# Patient Record
Sex: FEMALE | Race: WHITE | Employment: UNEMPLOYED | URBAN - METROPOLITAN AREA
[De-identification: names, ages, dates, MRNs, and addresses within clinical notes are randomized per-mention and may not be internally consistent; named-entity substitution may affect disease eponyms.]

---

## 2024-01-21 ENCOUNTER — HOSPITAL ENCOUNTER (EMERGENCY)
Age: 64
Discharge: ANOTHER HOSPITAL | End: 2024-01-22
Attending: EMERGENCY MEDICINE | Admitting: EMERGENCY MEDICINE
Payer: MEDICAID

## 2024-01-21 ENCOUNTER — APPOINTMENT (OUTPATIENT)
Dept: CT IMAGING | Age: 64
End: 2024-01-21
Payer: MEDICAID

## 2024-01-21 DIAGNOSIS — M86.60 OTHER CHRONIC OSTEOMYELITIS, UNSPECIFIED SITE (HCC): ICD-10-CM

## 2024-01-21 DIAGNOSIS — M53.3 SACRAL BACK PAIN: Primary | ICD-10-CM

## 2024-01-21 LAB
BASOPHILS # BLD: 0 K/UL (ref 0–0.1)
BASOPHILS NFR BLD: 0.4 % (ref 0.1–1.2)
EOSINOPHIL # BLD: 0.2 K/UL (ref 0–0.4)
EOSINOPHIL NFR BLD: 1.9 % (ref 0.7–5.8)
ERYTHROCYTE [DISTWIDTH] IN BLOOD BY AUTOMATED COUNT: 12.4 % (ref 11.7–14.4)
HCT VFR BLD AUTO: 45.2 % (ref 37–47)
HGB BLD-MCNC: 15.4 G/DL (ref 11.2–15.7)
IMM GRANULOCYTES # BLD: 0 K/UL
IMM GRANULOCYTES NFR BLD: 0.3 %
LYMPHOCYTES # BLD: 1 K/UL (ref 1.2–3.7)
LYMPHOCYTES NFR BLD: 11.2 %
MCH RBC QN AUTO: 32.5 PG (ref 25.6–32.2)
MCHC RBC AUTO-ENTMCNC: 34.1 % (ref 32.2–35.5)
MCV RBC AUTO: 95.4 FL (ref 79.4–94.8)
MONOCYTES # BLD: 1 K/UL (ref 0.2–0.9)
MONOCYTES NFR BLD: 11.5 % (ref 4.7–12.5)
NEUTROPHILS # BLD: 6.7 K/UL (ref 1.6–6.1)
NEUTS SEG NFR BLD: 74.7 % (ref 34–71.1)
PLATELET # BLD AUTO: 296 K/UL (ref 182–369)
RBC # BLD AUTO: 4.74 M/UL (ref 3.93–5.22)
WBC # BLD AUTO: 9 K/UL (ref 4–10)

## 2024-01-21 PROCEDURE — 85025 COMPLETE CBC W/AUTO DIFF WBC: CPT

## 2024-01-21 PROCEDURE — 36415 COLL VENOUS BLD VENIPUNCTURE: CPT

## 2024-01-21 PROCEDURE — 6360000002 HC RX W HCPCS: Performed by: EMERGENCY MEDICINE

## 2024-01-21 PROCEDURE — 96365 THER/PROPH/DIAG IV INF INIT: CPT

## 2024-01-21 PROCEDURE — 96375 TX/PRO/DX INJ NEW DRUG ADDON: CPT

## 2024-01-21 PROCEDURE — 96366 THER/PROPH/DIAG IV INF ADDON: CPT

## 2024-01-21 PROCEDURE — 80053 COMPREHEN METABOLIC PANEL: CPT

## 2024-01-21 PROCEDURE — 99285 EMERGENCY DEPT VISIT HI MDM: CPT

## 2024-01-21 PROCEDURE — 72131 CT LUMBAR SPINE W/O DYE: CPT

## 2024-01-21 PROCEDURE — 96368 THER/DIAG CONCURRENT INF: CPT

## 2024-01-21 PROCEDURE — 72192 CT PELVIS W/O DYE: CPT

## 2024-01-21 RX ORDER — DULOXETIN HYDROCHLORIDE 60 MG/1
60 CAPSULE, DELAYED RELEASE ORAL DAILY
COMMUNITY
Start: 2023-07-18 | End: 2024-07-17

## 2024-01-21 RX ORDER — ONDANSETRON 2 MG/ML
4 INJECTION INTRAMUSCULAR; INTRAVENOUS ONCE
Status: COMPLETED | OUTPATIENT
Start: 2024-01-21 | End: 2024-01-21

## 2024-01-21 RX ORDER — PREGABALIN 200 MG/1
200 CAPSULE ORAL 2 TIMES DAILY
COMMUNITY
Start: 2023-09-08

## 2024-01-21 RX ORDER — LEVOTHYROXINE SODIUM 0.05 MG/1
50 TABLET ORAL DAILY
COMMUNITY
Start: 2023-07-18

## 2024-01-21 RX ORDER — MORPHINE SULFATE 4 MG/ML
4 INJECTION, SOLUTION INTRAMUSCULAR; INTRAVENOUS
Status: COMPLETED | OUTPATIENT
Start: 2024-01-21 | End: 2024-01-21

## 2024-01-21 RX ORDER — AMITRIPTYLINE HYDROCHLORIDE 25 MG/1
1 TABLET, FILM COATED ORAL NIGHTLY
COMMUNITY
Start: 2023-07-18

## 2024-01-21 RX ORDER — SIMVASTATIN 20 MG
20 TABLET ORAL NIGHTLY
COMMUNITY
Start: 2023-07-18

## 2024-01-21 RX ADMIN — MORPHINE SULFATE 4 MG: 4 INJECTION INTRAVENOUS at 23:37

## 2024-01-21 RX ADMIN — ONDANSETRON 4 MG: 2 INJECTION INTRAMUSCULAR; INTRAVENOUS at 23:37

## 2024-01-21 ASSESSMENT — PAIN - FUNCTIONAL ASSESSMENT: PAIN_FUNCTIONAL_ASSESSMENT: 0-10

## 2024-01-21 ASSESSMENT — LIFESTYLE VARIABLES
HOW MANY STANDARD DRINKS CONTAINING ALCOHOL DO YOU HAVE ON A TYPICAL DAY: PATIENT DOES NOT DRINK
HOW OFTEN DO YOU HAVE A DRINK CONTAINING ALCOHOL: NEVER

## 2024-01-21 ASSESSMENT — PAIN DESCRIPTION - LOCATION
LOCATION: BACK;HIP
LOCATION: BACK;HIP

## 2024-01-21 ASSESSMENT — PAIN SCALES - GENERAL
PAINLEVEL_OUTOF10: 9
PAINLEVEL_OUTOF10: 9

## 2024-01-21 ASSESSMENT — PAIN DESCRIPTION - ORIENTATION: ORIENTATION: LOWER;RIGHT

## 2024-01-22 ENCOUNTER — HOSPITAL ENCOUNTER (INPATIENT)
Dept: ULTRASOUND IMAGING | Age: 64
Discharge: HOME | DRG: 347 | End: 2024-01-24
Attending: INTERNAL MEDICINE
Payer: MEDICAID

## 2024-01-22 ENCOUNTER — HOSPITAL ENCOUNTER (INPATIENT)
Age: 64
LOS: 1 days | Discharge: HOME HEALTH | DRG: 347 | End: 2024-01-23
Attending: INTERNAL MEDICINE | Admitting: INTERNAL MEDICINE
Payer: MEDICAID

## 2024-01-22 VITALS
OXYGEN SATURATION: 91 % | DIASTOLIC BLOOD PRESSURE: 46 MMHG | HEIGHT: 65 IN | BODY MASS INDEX: 31.65 KG/M2 | HEART RATE: 76 BPM | SYSTOLIC BLOOD PRESSURE: 96 MMHG | TEMPERATURE: 98 F | WEIGHT: 190 LBS | RESPIRATION RATE: 18 BRPM

## 2024-01-22 VITALS
DIASTOLIC BLOOD PRESSURE: 65 MMHG | OXYGEN SATURATION: 93 % | HEART RATE: 68 BPM | SYSTOLIC BLOOD PRESSURE: 109 MMHG | RESPIRATION RATE: 16 BRPM

## 2024-01-22 DIAGNOSIS — M53.3 SACRAL BACK PAIN: Primary | ICD-10-CM

## 2024-01-22 PROBLEM — L03.317 CELLULITIS AND ABSCESS OF BUTTOCK: Status: ACTIVE | Noted: 2024-01-22

## 2024-01-22 PROBLEM — L02.31 CELLULITIS AND ABSCESS OF BUTTOCK: Status: ACTIVE | Noted: 2024-01-22

## 2024-01-22 LAB
ALBUMIN SERPL-MCNC: 3.7 G/DL (ref 3.5–4.6)
ALBUMIN SERPL-MCNC: 4.2 G/DL (ref 3.5–4.6)
ALP SERPL-CCNC: 80 U/L (ref 40–130)
ALP SERPL-CCNC: 93 U/L (ref 40–130)
ALT SERPL-CCNC: 11 U/L (ref 0–33)
ALT SERPL-CCNC: 9 U/L (ref 0–33)
ANION GAP SERPL CALCULATED.3IONS-SCNC: 11 MEQ/L (ref 9–15)
ANION GAP SERPL CALCULATED.3IONS-SCNC: 12 MEQ/L (ref 9–15)
AST SERPL-CCNC: 14 U/L (ref 0–35)
AST SERPL-CCNC: 14 U/L (ref 0–35)
BASOPHILS # BLD: 0 K/UL (ref 0–0.2)
BASOPHILS NFR BLD: 0.5 %
BILIRUB SERPL-MCNC: 0.4 MG/DL (ref 0.2–0.7)
BILIRUB SERPL-MCNC: 0.6 MG/DL (ref 0.2–0.7)
BUN SERPL-MCNC: 11 MG/DL (ref 8–23)
BUN SERPL-MCNC: 12 MG/DL (ref 8–23)
CALCIUM SERPL-MCNC: 8.8 MG/DL (ref 8.5–9.9)
CALCIUM SERPL-MCNC: 9.8 MG/DL (ref 8.5–9.9)
CHLORIDE SERPL-SCNC: 104 MEQ/L (ref 95–107)
CHLORIDE SERPL-SCNC: 107 MEQ/L (ref 95–107)
CO2 SERPL-SCNC: 25 MEQ/L (ref 20–31)
CO2 SERPL-SCNC: 27 MEQ/L (ref 20–31)
CREAT SERPL-MCNC: 0.73 MG/DL (ref 0.5–0.9)
CREAT SERPL-MCNC: 0.77 MG/DL (ref 0.5–0.9)
EOSINOPHIL # BLD: 0.2 K/UL (ref 0–0.7)
EOSINOPHIL NFR BLD: 2 %
ERYTHROCYTE [DISTWIDTH] IN BLOOD BY AUTOMATED COUNT: 12.5 % (ref 11.5–14.5)
GLOBULIN SER CALC-MCNC: 2.8 G/DL (ref 2.3–3.5)
GLOBULIN SER CALC-MCNC: 3.1 G/DL (ref 2.3–3.5)
GLUCOSE SERPL-MCNC: 111 MG/DL (ref 70–99)
GLUCOSE SERPL-MCNC: 120 MG/DL (ref 70–99)
HCT VFR BLD AUTO: 41.8 % (ref 37–47)
HGB BLD-MCNC: 14 G/DL (ref 12–16)
INR PPP: 1.1
LYMPHOCYTES # BLD: 1.2 K/UL (ref 1–4.8)
LYMPHOCYTES NFR BLD: 15.6 %
MCH RBC QN AUTO: 32.1 PG (ref 27–31.3)
MCHC RBC AUTO-ENTMCNC: 33.5 % (ref 33–37)
MCV RBC AUTO: 95.9 FL (ref 79.4–94.8)
MONOCYTES # BLD: 1.2 K/UL (ref 0.2–0.8)
MONOCYTES NFR BLD: 16 %
NEUTROPHILS # BLD: 5 K/UL (ref 1.4–6.5)
NEUTS SEG NFR BLD: 65.5 %
PLATELET # BLD AUTO: 303 K/UL (ref 130–400)
POTASSIUM SERPL-SCNC: 3.3 MEQ/L (ref 3.4–4.9)
POTASSIUM SERPL-SCNC: 3.9 MEQ/L (ref 3.4–4.9)
PROT SERPL-MCNC: 6.5 G/DL (ref 6.3–8)
PROT SERPL-MCNC: 7.3 G/DL (ref 6.3–8)
PROTHROMBIN TIME: 15.3 SEC (ref 12.3–14.9)
RBC # BLD AUTO: 4.36 M/UL (ref 4.2–5.4)
SODIUM SERPL-SCNC: 143 MEQ/L (ref 135–144)
SODIUM SERPL-SCNC: 143 MEQ/L (ref 135–144)
WBC # BLD AUTO: 7.6 K/UL (ref 4.8–10.8)

## 2024-01-22 PROCEDURE — 6370000000 HC RX 637 (ALT 250 FOR IP): Performed by: INTERNAL MEDICINE

## 2024-01-22 PROCEDURE — 6360000002 HC RX W HCPCS: Performed by: INTERNAL MEDICINE

## 2024-01-22 PROCEDURE — 2580000003 HC RX 258: Performed by: INTERNAL MEDICINE

## 2024-01-22 PROCEDURE — 2500000003 HC RX 250 WO HCPCS: Performed by: RADIOLOGY

## 2024-01-22 PROCEDURE — 99254 IP/OBS CNSLTJ NEW/EST MOD 60: CPT | Performed by: INTERNAL MEDICINE

## 2024-01-22 PROCEDURE — 2580000003 HC RX 258: Performed by: EMERGENCY MEDICINE

## 2024-01-22 PROCEDURE — 76942 ECHO GUIDE FOR BIOPSY: CPT | Performed by: RADIOLOGY

## 2024-01-22 PROCEDURE — 85025 COMPLETE CBC W/AUTO DIFF WBC: CPT

## 2024-01-22 PROCEDURE — 99222 1ST HOSP IP/OBS MODERATE 55: CPT | Performed by: NURSE PRACTITIONER

## 2024-01-22 PROCEDURE — 36415 COLL VENOUS BLD VENIPUNCTURE: CPT

## 2024-01-22 PROCEDURE — 97162 PT EVAL MOD COMPLEX 30 MIN: CPT

## 2024-01-22 PROCEDURE — 1210000000 HC MED SURG R&B

## 2024-01-22 PROCEDURE — 97166 OT EVAL MOD COMPLEX 45 MIN: CPT

## 2024-01-22 PROCEDURE — 0YJ13ZZ INSPECTION OF LEFT BUTTOCK, PERCUTANEOUS APPROACH: ICD-10-PCS | Performed by: RADIOLOGY

## 2024-01-22 PROCEDURE — 80053 COMPREHEN METABOLIC PANEL: CPT

## 2024-01-22 PROCEDURE — 6360000002 HC RX W HCPCS: Performed by: EMERGENCY MEDICINE

## 2024-01-22 PROCEDURE — 76942 ECHO GUIDE FOR BIOPSY: CPT

## 2024-01-22 PROCEDURE — 85610 PROTHROMBIN TIME: CPT

## 2024-01-22 RX ORDER — SODIUM CHLORIDE 0.9 % (FLUSH) 0.9 %
5-40 SYRINGE (ML) INJECTION EVERY 12 HOURS SCHEDULED
Status: DISCONTINUED | OUTPATIENT
Start: 2024-01-22 | End: 2024-01-23 | Stop reason: HOSPADM

## 2024-01-22 RX ORDER — ONDANSETRON 2 MG/ML
4 INJECTION INTRAMUSCULAR; INTRAVENOUS EVERY 6 HOURS PRN
Status: DISCONTINUED | OUTPATIENT
Start: 2024-01-22 | End: 2024-01-23 | Stop reason: HOSPADM

## 2024-01-22 RX ORDER — ENOXAPARIN SODIUM 100 MG/ML
40 INJECTION SUBCUTANEOUS DAILY
Status: DISCONTINUED | OUTPATIENT
Start: 2024-01-22 | End: 2024-01-23 | Stop reason: HOSPADM

## 2024-01-22 RX ORDER — TRAMADOL HYDROCHLORIDE 50 MG/1
50 TABLET ORAL EVERY 6 HOURS PRN
Status: DISCONTINUED | OUTPATIENT
Start: 2024-01-22 | End: 2024-01-22

## 2024-01-22 RX ORDER — PREGABALIN 100 MG/1
200 CAPSULE ORAL 2 TIMES DAILY
Status: DISCONTINUED | OUTPATIENT
Start: 2024-01-22 | End: 2024-01-23 | Stop reason: HOSPADM

## 2024-01-22 RX ORDER — OXYCODONE HYDROCHLORIDE 5 MG/1
5 TABLET ORAL EVERY 4 HOURS PRN
Status: DISCONTINUED | OUTPATIENT
Start: 2024-01-22 | End: 2024-01-23 | Stop reason: HOSPADM

## 2024-01-22 RX ORDER — TRAMADOL HYDROCHLORIDE 50 MG/1
TABLET ORAL
Status: DISCONTINUED
Start: 2024-01-22 | End: 2024-01-22

## 2024-01-22 RX ORDER — ONDANSETRON 4 MG/1
4 TABLET, ORALLY DISINTEGRATING ORAL EVERY 8 HOURS PRN
Status: DISCONTINUED | OUTPATIENT
Start: 2024-01-22 | End: 2024-01-23 | Stop reason: HOSPADM

## 2024-01-22 RX ORDER — POTASSIUM CHLORIDE 7.45 MG/ML
10 INJECTION INTRAVENOUS PRN
Status: DISCONTINUED | OUTPATIENT
Start: 2024-01-22 | End: 2024-01-23 | Stop reason: HOSPADM

## 2024-01-22 RX ORDER — MAGNESIUM SULFATE IN WATER 40 MG/ML
2000 INJECTION, SOLUTION INTRAVENOUS PRN
Status: DISCONTINUED | OUTPATIENT
Start: 2024-01-22 | End: 2024-01-23 | Stop reason: HOSPADM

## 2024-01-22 RX ORDER — ACETAMINOPHEN 325 MG/1
650 TABLET ORAL EVERY 6 HOURS PRN
Status: DISCONTINUED | OUTPATIENT
Start: 2024-01-22 | End: 2024-01-22

## 2024-01-22 RX ORDER — POTASSIUM CHLORIDE 20 MEQ/1
40 TABLET, EXTENDED RELEASE ORAL PRN
Status: DISCONTINUED | OUTPATIENT
Start: 2024-01-22 | End: 2024-01-23 | Stop reason: HOSPADM

## 2024-01-22 RX ORDER — POLYETHYLENE GLYCOL 3350 17 G/17G
17 POWDER, FOR SOLUTION ORAL DAILY PRN
Status: DISCONTINUED | OUTPATIENT
Start: 2024-01-22 | End: 2024-01-23 | Stop reason: HOSPADM

## 2024-01-22 RX ORDER — KETOROLAC TROMETHAMINE 15 MG/ML
15 INJECTION, SOLUTION INTRAMUSCULAR; INTRAVENOUS EVERY 6 HOURS PRN
Status: DISCONTINUED | OUTPATIENT
Start: 2024-01-22 | End: 2024-01-22

## 2024-01-22 RX ORDER — ACETAMINOPHEN 325 MG/1
650 TABLET ORAL 3 TIMES DAILY
Status: DISCONTINUED | OUTPATIENT
Start: 2024-01-22 | End: 2024-01-23 | Stop reason: HOSPADM

## 2024-01-22 RX ORDER — SODIUM CHLORIDE 9 MG/ML
INJECTION, SOLUTION INTRAVENOUS PRN
Status: DISCONTINUED | OUTPATIENT
Start: 2024-01-22 | End: 2024-01-23 | Stop reason: HOSPADM

## 2024-01-22 RX ORDER — MORPHINE SULFATE 2 MG/ML
2 INJECTION, SOLUTION INTRAMUSCULAR; INTRAVENOUS EVERY 4 HOURS PRN
Status: DISCONTINUED | OUTPATIENT
Start: 2024-01-22 | End: 2024-01-23

## 2024-01-22 RX ORDER — DULOXETIN HYDROCHLORIDE 30 MG/1
60 CAPSULE, DELAYED RELEASE ORAL DAILY
Status: DISCONTINUED | OUTPATIENT
Start: 2024-01-22 | End: 2024-01-23 | Stop reason: HOSPADM

## 2024-01-22 RX ORDER — KETOROLAC TROMETHAMINE 15 MG/ML
15 INJECTION, SOLUTION INTRAMUSCULAR; INTRAVENOUS EVERY 8 HOURS
Status: DISCONTINUED | OUTPATIENT
Start: 2024-01-22 | End: 2024-01-23 | Stop reason: HOSPADM

## 2024-01-22 RX ORDER — CYCLOBENZAPRINE HCL 5 MG
10 TABLET ORAL 3 TIMES DAILY PRN
Status: DISCONTINUED | OUTPATIENT
Start: 2024-01-22 | End: 2024-01-23 | Stop reason: HOSPADM

## 2024-01-22 RX ORDER — ATORVASTATIN CALCIUM 10 MG/1
10 TABLET, FILM COATED ORAL
Status: DISCONTINUED | OUTPATIENT
Start: 2024-01-22 | End: 2024-01-23 | Stop reason: HOSPADM

## 2024-01-22 RX ORDER — ONDANSETRON 2 MG/ML
4 INJECTION INTRAMUSCULAR; INTRAVENOUS ONCE
Status: DISCONTINUED | OUTPATIENT
Start: 2024-01-22 | End: 2024-01-22 | Stop reason: HOSPADM

## 2024-01-22 RX ORDER — AMITRIPTYLINE HYDROCHLORIDE 25 MG/1
25 TABLET, FILM COATED ORAL NIGHTLY
Status: DISCONTINUED | OUTPATIENT
Start: 2024-01-22 | End: 2024-01-23 | Stop reason: HOSPADM

## 2024-01-22 RX ORDER — LIDOCAINE HYDROCHLORIDE 20 MG/ML
INJECTION, SOLUTION INFILTRATION; PERINEURAL PRN
Status: COMPLETED | OUTPATIENT
Start: 2024-01-22 | End: 2024-01-22

## 2024-01-22 RX ORDER — HYDROMORPHONE HYDROCHLORIDE 1 MG/ML
1 INJECTION, SOLUTION INTRAMUSCULAR; INTRAVENOUS; SUBCUTANEOUS
Status: COMPLETED | OUTPATIENT
Start: 2024-01-22 | End: 2024-01-22

## 2024-01-22 RX ORDER — SODIUM CHLORIDE 0.9 % (FLUSH) 0.9 %
5-40 SYRINGE (ML) INJECTION PRN
Status: DISCONTINUED | OUTPATIENT
Start: 2024-01-22 | End: 2024-01-23 | Stop reason: HOSPADM

## 2024-01-22 RX ORDER — LEVOTHYROXINE SODIUM 0.05 MG/1
50 TABLET ORAL DAILY
Status: DISCONTINUED | OUTPATIENT
Start: 2024-01-22 | End: 2024-01-23 | Stop reason: HOSPADM

## 2024-01-22 RX ORDER — NICOTINE 21 MG/24HR
1 PATCH, TRANSDERMAL 24 HOURS TRANSDERMAL DAILY
Status: DISCONTINUED | OUTPATIENT
Start: 2024-01-22 | End: 2024-01-23 | Stop reason: HOSPADM

## 2024-01-22 RX ORDER — LIDOCAINE 4 G/G
1 PATCH TOPICAL DAILY
Status: DISCONTINUED | OUTPATIENT
Start: 2024-01-22 | End: 2024-01-23 | Stop reason: HOSPADM

## 2024-01-22 RX ORDER — ACETAMINOPHEN 650 MG/1
650 SUPPOSITORY RECTAL EVERY 6 HOURS PRN
Status: DISCONTINUED | OUTPATIENT
Start: 2024-01-22 | End: 2024-01-22

## 2024-01-22 RX ADMIN — ACETAMINOPHEN 650 MG: 325 TABLET ORAL at 10:17

## 2024-01-22 RX ADMIN — KETOROLAC TROMETHAMINE 15 MG: 15 INJECTION, SOLUTION INTRAMUSCULAR; INTRAVENOUS at 08:00

## 2024-01-22 RX ADMIN — MORPHINE SULFATE 2 MG: 2 INJECTION, SOLUTION INTRAMUSCULAR; INTRAVENOUS at 15:46

## 2024-01-22 RX ADMIN — OXYCODONE 5 MG: 5 TABLET ORAL at 19:57

## 2024-01-22 RX ADMIN — HYDROMORPHONE HYDROCHLORIDE 1 MG: 1 INJECTION, SOLUTION INTRAMUSCULAR; INTRAVENOUS; SUBCUTANEOUS at 00:33

## 2024-01-22 RX ADMIN — PREGABALIN 200 MG: 100 CAPSULE ORAL at 10:13

## 2024-01-22 RX ADMIN — OXYCODONE 5 MG: 5 TABLET ORAL at 10:13

## 2024-01-22 RX ADMIN — PREGABALIN 200 MG: 100 CAPSULE ORAL at 19:50

## 2024-01-22 RX ADMIN — ATORVASTATIN CALCIUM 10 MG: 10 TABLET, FILM COATED ORAL at 19:50

## 2024-01-22 RX ADMIN — LEVOTHYROXINE SODIUM 50 MCG: 0.05 TABLET ORAL at 10:13

## 2024-01-22 RX ADMIN — TRAMADOL HYDROCHLORIDE 50 MG: 50 TABLET ORAL at 05:40

## 2024-01-22 RX ADMIN — AMITRIPTYLINE HYDROCHLORIDE 25 MG: 25 TABLET, FILM COATED ORAL at 19:56

## 2024-01-22 RX ADMIN — LIDOCAINE HYDROCHLORIDE 15 ML: 20 INJECTION, SOLUTION INFILTRATION; PERINEURAL at 15:22

## 2024-01-22 RX ADMIN — VANCOMYCIN HYDROCHLORIDE 2000 MG: 1 INJECTION, POWDER, LYOPHILIZED, FOR SOLUTION INTRAVENOUS at 00:33

## 2024-01-22 RX ADMIN — SODIUM CHLORIDE, PRESERVATIVE FREE 10 ML: 5 INJECTION INTRAVENOUS at 19:51

## 2024-01-22 RX ADMIN — SODIUM CHLORIDE 2000 MG: 9 INJECTION, SOLUTION INTRAVENOUS at 00:10

## 2024-01-22 RX ADMIN — ACETAMINOPHEN 650 MG: 325 TABLET ORAL at 19:50

## 2024-01-22 RX ADMIN — CYCLOBENZAPRINE HYDROCHLORIDE 10 MG: 5 TABLET, FILM COATED ORAL at 10:14

## 2024-01-22 RX ADMIN — SODIUM CHLORIDE, PRESERVATIVE FREE 10 ML: 5 INJECTION INTRAVENOUS at 08:00

## 2024-01-22 RX ADMIN — KETOROLAC TROMETHAMINE 15 MG: 15 INJECTION, SOLUTION INTRAMUSCULAR; INTRAVENOUS at 19:51

## 2024-01-22 RX ADMIN — CYCLOBENZAPRINE HYDROCHLORIDE 10 MG: 5 TABLET, FILM COATED ORAL at 19:50

## 2024-01-22 RX ADMIN — DULOXETINE 60 MG: 30 CAPSULE, DELAYED RELEASE ORAL at 10:13

## 2024-01-22 RX ADMIN — OXYCODONE 5 MG: 5 TABLET ORAL at 14:07

## 2024-01-22 RX ADMIN — KETOROLAC TROMETHAMINE 15 MG: 15 INJECTION, SOLUTION INTRAMUSCULAR; INTRAVENOUS at 14:06

## 2024-01-22 ASSESSMENT — PAIN DESCRIPTION - DESCRIPTORS
DESCRIPTORS: ACHING;DISCOMFORT;THROBBING
DESCRIPTORS: DISCOMFORT
DESCRIPTORS: BURNING;DISCOMFORT
DESCRIPTORS: ACHING;BURNING;DISCOMFORT
DESCRIPTORS: BURNING

## 2024-01-22 ASSESSMENT — PAIN SCALES - GENERAL
PAINLEVEL_OUTOF10: 8
PAINLEVEL_OUTOF10: 2
PAINLEVEL_OUTOF10: 7
PAINLEVEL_OUTOF10: 0
PAINLEVEL_OUTOF10: 10
PAINLEVEL_OUTOF10: 7
PAINLEVEL_OUTOF10: 9
PAINLEVEL_OUTOF10: 7
PAINLEVEL_OUTOF10: 8

## 2024-01-22 ASSESSMENT — PAIN DESCRIPTION - LOCATION
LOCATION: LEG
LOCATION: BACK;BUTTOCKS;HIP
LOCATION: LEG
LOCATION: BACK;LEG
LOCATION: BACK
LOCATION: BACK
LOCATION: BACK;BUTTOCKS
LOCATION: LEG
LOCATION: BACK
LOCATION: LEG
LOCATION: BACK;HIP

## 2024-01-22 ASSESSMENT — PAIN DESCRIPTION - ORIENTATION
ORIENTATION: RIGHT
ORIENTATION: RIGHT;LOWER
ORIENTATION: RIGHT
ORIENTATION: LEFT

## 2024-01-22 ASSESSMENT — ENCOUNTER SYMPTOMS
BACK PAIN: 1
RESPIRATORY NEGATIVE: 1
GASTROINTESTINAL NEGATIVE: 1

## 2024-01-22 NOTE — PROGRESS NOTES
1230 update given to Cecile AVELAR regarding procedure for US guided gluteal aspiration today. Will call for patient to come to radiology around 1430.

## 2024-01-22 NOTE — BRIEF OP NOTE
Preliminary  Procedure Note, Full Note To Follow in PACS  Vascular and Interventional Radiology      Nena Christian  1960  [id number]    Date of Procedure: 01/22/24    Physician: Guillermo Moran MD, DABR    Pre-Op Diagnosis: Fluid collection in left gluteal region     Post-Op Diagnosis:  No fluid was able to be aspirated, either too small or scarr       Procedure: Attempted aspiration of left gluteal collection    Estimated Blood Loss (mL): Minimal    Complications: None    Specimens: none    Implants: none    Drains: none    Findings: Needle with sheath was advanced into the collection under ultrasound. Attempted aspiration did not reveal any fluid. Will image with CT again in a few days, if discharged as outpatient. If larger we could attempt re-aspiration.     Electronically signed by GUILLERMO MORAN MD on 1/22/2024 at 3:29 PM

## 2024-01-22 NOTE — H&P
01/21/2024 11:38 PM     01/21/2024 11:38 PM    CO2 27 01/21/2024 11:38 PM    BUN 12 01/21/2024 11:38 PM    CREATININE 0.77 01/21/2024 11:38 PM    GFRAA 69.7 11/25/2011 11:08 PM    LABGLOM >60.0 01/21/2024 11:38 PM    GLUCOSE 120 01/21/2024 11:38 PM    GLUCOSE 105 11/25/2011 11:08 PM    PROT 7.3 01/21/2024 11:38 PM    LABALBU 4.2 01/21/2024 11:38 PM    LABALBU 4.0 11/25/2011 11:08 PM    CALCIUM 9.8 01/21/2024 11:38 PM    BILITOT 0.6 01/21/2024 11:38 PM    ALKPHOS 93 01/21/2024 11:38 PM    AST 14 01/21/2024 11:38 PM    ALT 11 01/21/2024 11:38 PM     BMP:    Lab Results   Component Value Date/Time     01/21/2024 11:38 PM    K 3.3 01/21/2024 11:38 PM     01/21/2024 11:38 PM    CO2 27 01/21/2024 11:38 PM    BUN 12 01/21/2024 11:38 PM    LABALBU 4.2 01/21/2024 11:38 PM    LABALBU 4.0 11/25/2011 11:08 PM    CREATININE 0.77 01/21/2024 11:38 PM    CALCIUM 9.8 01/21/2024 11:38 PM    GFRAA 69.7 11/25/2011 11:08 PM    LABGLOM >60.0 01/21/2024 11:38 PM    GLUCOSE 120 01/21/2024 11:38 PM    GLUCOSE 105 11/25/2011 11:08 PM       Radiology: No results found.      ASSESSMENT/ PLAN::      Principal Problem:    Sacral pain  Resolved Problems:    * No resolved hospital problems. *      Fluid collection extending into the gluteal region-no recent surgery or procedure.  No recent infection or sepsis.  Unknown if there is sterile fluid collection versus abscess.  ID is on consult.  Will follow the recommendation regarding antibiotic if needed.  Will consult IR for drainage.  Sacroiliitis-pain control   Chronic osteomyelitis-history of recurrent osteomyelitis in the pubic symphysis and ramus area more than 10 years ago.  No signs of infection currently.  Patient did receive antibiotic in the ER but unknown if she requires antibiotic.  Will consult infectious disease for recommendation.  Hypokalemia-recheck and treat    Code Status: Full  DVT prophylaxis: Lovenox    I spent 55 minutes in total reviewing labs, imaging,

## 2024-01-22 NOTE — PLAN OF CARE
See OT evaluation for all goals and OT POC. Electronically signed by Lela Rolle OTR/L on 1/22/2024 at 1:06 PM

## 2024-01-22 NOTE — ED PROVIDER NOTES
initially without much improvement of her discomfort.  Patient was started on IV vancomycin and IV Rocephin while in the emergency department.  She was also given additional 1 mg of IV Dilaudid and 4 mg of IV Zofran with improvement of her discomfort.    Case was discussed with Dr. Walker, via [facility], who recommended the patient be transferred to [facility] Case was then discussed with Dr. Campbell who agreed to accept the patient in transfer.    Final Clinical impression;  1) bilateral sacroiliitis  2) CT scan concerning for chronic osteomyelitis    Disposition/plan; patient transferred via EMS to [facility] for further evaluation and treatment.     Josafat Canada, DO  01/22/24 0202

## 2024-01-22 NOTE — CARE COORDINATION
Case Management Assessment  Initial Evaluation    Date/Time of Evaluation: 1/22/2024 11:49 AM  Assessment Completed by: JERARDO Esquivel    If patient is discharged prior to next notation, then this note serves as note for discharge by case management.    Patient Name: Nena Christian                   YOB: 1960  Diagnosis: Sacral pain [M53.3]                   Date / Time: 1/22/2024  3:47 AM    Patient Admission Status: Inpatient   Readmission Risk (Low < 19, Mod (19-27), High > 27): Readmission Risk Score: 7.6    Current PCP: Chon Jo MD  PCP verified by CM? Yes    Chart Reviewed: Yes      History Provided by: Patient  Patient Orientation: Alert and Oriented    Patient Cognition: Alert    Hospitalization in the last 30 days (Readmission):  No    If yes, Readmission Assessment in CM Navigator will be completed.    Advance Directives:      Code Status: Full Code   Patient's Primary Decision Maker is: Legal Next of Kin    Primary Decision Maker: Cortes Christian - Child - [phone]    Discharge Planning:    Patient lives with: Spouse/Significant Other Type of Home: House  Primary Care Giver: Self  Patient Support Systems include: Family Members, Spouse/Significant Other   Current Financial resources: Medicaid  Current community resources: None  Current services prior to admission: None            Current DME:              Type of Home Care services:  None    ADLS  Prior functional level: Independent in ADLs/IADLs  Current functional level: Independent in ADLs/IADLs    PT AM-PAC:   /24  OT AM-PAC:   /24    Family can provide assistance at DC: Yes  Would you like Case Management to discuss the discharge plan with any other family members/significant others, and if so, who? Yes (bf)  Plans to Return to Present Housing: Yes  Other Identified Issues/Barriers to RETURNING to current housing: MEDICAL CLEARANCE  Potential Assistance needed at discharge: N/A            Potential DME:    Patient expects to

## 2024-01-22 NOTE — ED TRIAGE NOTES
Pt. Presents to ED with complaints of lower back pain that radiates into right hip and down leg.  Pt. Concerned about osteomyelitis which she has had in the past.

## 2024-01-22 NOTE — OR NURSING
1505 Pt taken into US room, on bed, and assisted into prone position. Scans completed. Consent confirmed.    1511 Timeout completed. Dr. Cross obtaining US images.     1517 Site marked, prepped generously with chlorhexidine.    1520 After 3 minute dry time, draped in sterile fashion.     1522 Skin numbed with Lidocaine 2% by Roselyn Morales CNP using US guidance.     1523 US guidance used to place 5 Fr by 10 cm centesis introducer.  Additional lidocaine given through introducer.     1525 Dr. Cross and Roselyn Morales unable to obtain any fluid through centesis catheter.     1526 Introducer withdrawn, pressure held at site then band aid applied. Verbal and tactile reassurance provided throughout procedure.     1530 Report called to ROSIO Huber in TCU. Transport to take pt back to room.

## 2024-01-22 NOTE — PROGRESS NOTES
Physical Therapy Med Surg Initial Assessment  Facility/Department: [facility]  Room: [facility]/[place] [address]       NAME: Nena Christian  : 1960 (63 y.o.)  MRN: [id number]  CODE STATUS: Full Code    Date of Service: 2024    Patient Diagnosis(es): Sacral pain [M53.3]   No chief complaint on file.    Patient Active Problem List    Diagnosis Date Noted    Sacral pain 2024        Past Medical History:   Diagnosis Date    Hyperlipidemia     Thyroid disease      No past surgical history on file.    Chart Reviewed: Yes  Patient assessed for rehabilitation services?: Yes  Family / Caregiver Present: No  Diagnosis: Sacral pain  General Comment  Comments: Pt resting in bed - agreeable to PT evaluation    Restrictions:  Restrictions/Precautions: Fall Risk, Up as Tolerated     SUBJECTIVE:   Subjective: \"I hope these pain meds are working.\"    Pain  Pain: 3/10 pre and post session pain pelvis and LBP. RN notified. Pt agreeable to continue with session.    Prior Level of Function:  Social/Functional History  Lives With: Significant other  Type of Home: House  Home Layout: One level  Home Access: Level entry  Bathroom Shower/Tub: Walk-in shower  Bathroom Equipment: Shower chair  Home Equipment: Cane, Walker, rolling, Wheelchair-manual  Has the patient had two or more falls in the past year or any fall with injury in the past year?: No  ADL Assistance: Independent  Homemaking Assistance: Independent  Ambulation Assistance: Independent  Transfer Assistance: Independent  Active : Yes  Additional Comments: Pt states she has had previous bouts of osteomylitis    OBJECTIVE:   Vision  Vision: Impaired  Vision Exceptions: Wears glasses at all times  Hearing: Within functional limits    Cognition:  Overall Orientation Status: Within Functional Limits  Follows Commands: Within Functional Limits  Overall Cognitive Status: WFL    Observation/Palpation  Observation: Pt alert and attentive, no acute distress, pleasant and

## 2024-01-22 NOTE — PROGRESS NOTES
Pt assessed this AM.  PRN and routine meds given for back, bilat hip and rt leg pain.  Pt did report ordered Ultram was not effective and refused to take when offered.  New PRN Roxicodone ordered, pt reports effective.  Pt currently off unit for procedure with Dr Cross.

## 2024-01-22 NOTE — ACP (ADVANCE CARE PLANNING)
Advance Care Planning   Healthcare Decision Maker:    Primary Decision Maker: Cortes Christian - Child - [phone]    Click here to complete Healthcare Decision Makers including selection of the Healthcare Decision Maker Relationship (ie \"Primary\").  Today we documented Decision Maker(s) consistent with Legal Next of Kin hierarchy.       If the relationship to the patient does NOT follow our state's Next of Kin hierarchy, the patient MUST complete an ACP Document to allow him/her to act on the patient's behalf. :      Electronically signed by JERARDO Esquivel on 1/22/2024 at 11:49 AM

## 2024-01-22 NOTE — PROGRESS NOTES
[facility] OCC[facility]Y EVALUATION - ACUTE     NAME: Nena Christian  : 1960 (63 y.o.)  MRN: [id number]  CODE STATUS: Full Code  Room: [place] [address]    Date of Service: 2024    Patient Diagnosis(es): Sacral pain [M53.3]   Patient Active Problem List    Diagnosis Date Noted    Sacral pain 2024        Past Medical History:   Diagnosis Date    Hyperlipidemia     Thyroid disease      No past surgical history on file.     Restrictions  Restrictions/Precautions: Fall Risk, Up as Tolerated        Safety Devices: Safety Devices  Type of Devices: All fall risk precautions in place;Call light within reach;Bed alarm in place;Left in bed;Nurse notified     Patient's date of birth confirmed: Yes    General:  Chart Reviewed: Yes  Patient assessed for rehabilitation services?: Yes    Subjective  Subjective: \"I feel okay\"       Pain at start of treatment: No    Pain at end of treatment: No    Location: Reports pain with movement -3/10  Description: Sore/aching  Nursing notified: Declined  RN:   Intervention: Repositioned    Prior Level of Function:  Social/Functional History  Lives With: Significant other  Type of Home: House  Home Layout: One level  Home Access: Level entry  Bathroom Shower/Tub: Walk-in shower  Bathroom Equipment: Shower chair  Home Equipment: Cane, Walker, rolling, Wheelchair-manual  Has the patient had two or more falls in the past year or any fall with injury in the past year?: No  ADL Assistance: Independent  Homemaking Assistance: Independent  Ambulation Assistance: Independent  Transfer Assistance: Independent  Active : Yes  Additional Comments: Pt states she has had previous bouts of osteomylitis    OBJECTIVE:     Orientation Status:  Orientation  Overall Orientation Status: Within Functional Limits    Observation:  Observation/Palpation  Posture: Good  Observation: Pt alert and attentive, no acute distress, pleasant and agreeable to therapy assessment    Cognition

## 2024-01-23 VITALS
SYSTOLIC BLOOD PRESSURE: 123 MMHG | WEIGHT: 213.9 LBS | BODY MASS INDEX: 35.64 KG/M2 | DIASTOLIC BLOOD PRESSURE: 50 MMHG | RESPIRATION RATE: 20 BRPM | TEMPERATURE: 98 F | OXYGEN SATURATION: 95 % | HEART RATE: 66 BPM | HEIGHT: 65 IN

## 2024-01-23 PROBLEM — M79.18 BUTTOCK PAIN: Status: ACTIVE | Noted: 2024-01-23

## 2024-01-23 PROBLEM — R93.89 ABNORMAL CT SCAN: Status: ACTIVE | Noted: 2024-01-23

## 2024-01-23 LAB
ALBUMIN SERPL-MCNC: 3.5 G/DL (ref 3.5–4.6)
ALP SERPL-CCNC: 71 U/L (ref 40–130)
ALT SERPL-CCNC: 10 U/L (ref 0–33)
ANION GAP SERPL CALCULATED.3IONS-SCNC: 10 MEQ/L (ref 9–15)
AST SERPL-CCNC: 14 U/L (ref 0–35)
BASOPHILS # BLD: 0.1 K/UL (ref 0–0.2)
BASOPHILS NFR BLD: 1.1 %
BILIRUB SERPL-MCNC: 0.3 MG/DL (ref 0.2–0.7)
BUN SERPL-MCNC: 19 MG/DL (ref 8–23)
CALCIUM SERPL-MCNC: 8.6 MG/DL (ref 8.5–9.9)
CHLORIDE SERPL-SCNC: 105 MEQ/L (ref 95–107)
CO2 SERPL-SCNC: 27 MEQ/L (ref 20–31)
CREAT SERPL-MCNC: 1.04 MG/DL (ref 0.5–0.9)
EOSINOPHIL # BLD: 0.3 K/UL (ref 0–0.7)
EOSINOPHIL NFR BLD: 6.1 %
ERYTHROCYTE [DISTWIDTH] IN BLOOD BY AUTOMATED COUNT: 12.6 % (ref 11.5–14.5)
GLOBULIN SER CALC-MCNC: 2.4 G/DL (ref 2.3–3.5)
GLUCOSE SERPL-MCNC: 96 MG/DL (ref 70–99)
HCT VFR BLD AUTO: 39.2 % (ref 37–47)
HGB BLD-MCNC: 13.1 G/DL (ref 12–16)
LYMPHOCYTES # BLD: 0.9 K/UL (ref 1–4.8)
LYMPHOCYTES NFR BLD: 19.9 %
MCH RBC QN AUTO: 32.3 PG (ref 27–31.3)
MCHC RBC AUTO-ENTMCNC: 33.4 % (ref 33–37)
MCV RBC AUTO: 96.8 FL (ref 79.4–94.8)
MONOCYTES # BLD: 0.6 K/UL (ref 0.2–0.8)
MONOCYTES NFR BLD: 14 %
NEUTROPHILS # BLD: 2.6 K/UL (ref 1.4–6.5)
NEUTS SEG NFR BLD: 58.4 %
PLATELET # BLD AUTO: 263 K/UL (ref 130–400)
POTASSIUM SERPL-SCNC: 3.8 MEQ/L (ref 3.4–4.9)
PROT SERPL-MCNC: 5.9 G/DL (ref 6.3–8)
RBC # BLD AUTO: 4.05 M/UL (ref 4.2–5.4)
SODIUM SERPL-SCNC: 142 MEQ/L (ref 135–144)
WBC # BLD AUTO: 4.4 K/UL (ref 4.8–10.8)

## 2024-01-23 PROCEDURE — 2580000003 HC RX 258: Performed by: INTERNAL MEDICINE

## 2024-01-23 PROCEDURE — 85025 COMPLETE CBC W/AUTO DIFF WBC: CPT

## 2024-01-23 PROCEDURE — 99232 SBSQ HOSP IP/OBS MODERATE 35: CPT | Performed by: INTERNAL MEDICINE

## 2024-01-23 PROCEDURE — 6360000002 HC RX W HCPCS: Performed by: INTERNAL MEDICINE

## 2024-01-23 PROCEDURE — 36415 COLL VENOUS BLD VENIPUNCTURE: CPT

## 2024-01-23 PROCEDURE — 87040 BLOOD CULTURE FOR BACTERIA: CPT

## 2024-01-23 PROCEDURE — 97116 GAIT TRAINING THERAPY: CPT

## 2024-01-23 PROCEDURE — 97535 SELF CARE MNGMENT TRAINING: CPT

## 2024-01-23 PROCEDURE — 80053 COMPREHEN METABOLIC PANEL: CPT

## 2024-01-23 PROCEDURE — 99253 IP/OBS CNSLTJ NEW/EST LOW 45: CPT | Performed by: COLON & RECTAL SURGERY

## 2024-01-23 PROCEDURE — 99221 1ST HOSP IP/OBS SF/LOW 40: CPT | Performed by: PAIN MEDICINE

## 2024-01-23 PROCEDURE — 6370000000 HC RX 637 (ALT 250 FOR IP): Performed by: INTERNAL MEDICINE

## 2024-01-23 RX ORDER — CYCLOBENZAPRINE HCL 10 MG
10 TABLET ORAL 3 TIMES DAILY PRN
Qty: 21 TABLET | Refills: 0 | Status: SHIPPED | OUTPATIENT
Start: 2024-01-23 | End: 2024-02-02

## 2024-01-23 RX ORDER — 0.9 % SODIUM CHLORIDE 0.9 %
500 INTRAVENOUS SOLUTION INTRAVENOUS ONCE
Status: COMPLETED | OUTPATIENT
Start: 2024-01-23 | End: 2024-01-23

## 2024-01-23 RX ADMIN — KETOROLAC TROMETHAMINE 15 MG: 15 INJECTION, SOLUTION INTRAMUSCULAR; INTRAVENOUS at 05:12

## 2024-01-23 RX ADMIN — PREGABALIN 200 MG: 100 CAPSULE ORAL at 07:47

## 2024-01-23 RX ADMIN — OXYCODONE 5 MG: 5 TABLET ORAL at 06:08

## 2024-01-23 RX ADMIN — OXYCODONE 5 MG: 5 TABLET ORAL at 19:27

## 2024-01-23 RX ADMIN — SODIUM CHLORIDE 500 ML: 9 INJECTION, SOLUTION INTRAVENOUS at 09:28

## 2024-01-23 RX ADMIN — LEVOTHYROXINE SODIUM 50 MCG: 0.05 TABLET ORAL at 07:45

## 2024-01-23 RX ADMIN — KETOROLAC TROMETHAMINE 15 MG: 15 INJECTION, SOLUTION INTRAMUSCULAR; INTRAVENOUS at 12:15

## 2024-01-23 RX ADMIN — ACETAMINOPHEN 650 MG: 325 TABLET ORAL at 14:13

## 2024-01-23 RX ADMIN — SODIUM CHLORIDE, PRESERVATIVE FREE 10 ML: 5 INJECTION INTRAVENOUS at 07:46

## 2024-01-23 RX ADMIN — DULOXETINE 60 MG: 30 CAPSULE, DELAYED RELEASE ORAL at 07:45

## 2024-01-23 RX ADMIN — ACETAMINOPHEN 650 MG: 325 TABLET ORAL at 07:49

## 2024-01-23 RX ADMIN — CYCLOBENZAPRINE HYDROCHLORIDE 10 MG: 5 TABLET, FILM COATED ORAL at 06:08

## 2024-01-23 ASSESSMENT — PAIN DESCRIPTION - ORIENTATION
ORIENTATION: RIGHT
ORIENTATION: RIGHT

## 2024-01-23 ASSESSMENT — PAIN SCALES - GENERAL
PAINLEVEL_OUTOF10: 7
PAINLEVEL_OUTOF10: 4
PAINLEVEL_OUTOF10: 4
PAINLEVEL_OUTOF10: 7

## 2024-01-23 ASSESSMENT — PAIN DESCRIPTION - LOCATION
LOCATION: LEG
LOCATION: LEG
LOCATION: BACK

## 2024-01-23 ASSESSMENT — PAIN DESCRIPTION - DESCRIPTORS
DESCRIPTORS: ACHING
DESCRIPTORS: DISCOMFORT
DESCRIPTORS: DISCOMFORT

## 2024-01-23 ASSESSMENT — ENCOUNTER SYMPTOMS
GASTROINTESTINAL NEGATIVE: 1
RESPIRATORY NEGATIVE: 1
BACK PAIN: 1

## 2024-01-23 NOTE — PLAN OF CARE
Problem: Discharge Planning  Goal: Discharge to home or other facility with appropriate resources  Outcome: Progressing  Flowsheets (Taken 1/22/2024 0800 by Cecile Truong, RN)  Discharge to home or other facility with appropriate resources: Identify barriers to discharge with patient and caregiver     Problem: Safety - Adult  Goal: Free from fall injury  Outcome: Progressing     Problem: ABCDS Injury Assessment  Goal: Absence of physical injury  Outcome: Progressing     Problem: Pain  Goal: Verbalizes/displays adequate comfort level or baseline comfort level  Outcome: Progressing     Problem: Occupational Therapy - Adult  Goal: By Discharge: Performs self-care activities at highest level of function for planned discharge setting.  See evaluation for individualized goals.  1/22/2024 1306 by Lela Rolle, OTR/L  Outcome: Progressing     Problem: Physical Therapy - Adult  Goal: By Discharge: Performs mobility at highest level of function for planned discharge setting.  See evaluation for individualized goals.  1/22/2024 1327 by Maru Zavala, PT  Outcome: Progressing

## 2024-01-23 NOTE — CARE COORDINATION
Rounds done with bedside nurse and I met with patient to review dc plan and I also let her know that therapy recommended [facility]. Pt is agreeable to C. I offered freedom of choice and she chose [facility]. Pt is agreeable to nurse and therapy and states she does not want an aide. She states she has no hot water and has to heat her water for bathing and did not feel an aide would benefit her. Her plan will be home with [facility]. I called Anneliese to let her know we have [facility] order and I am not sure of dc date. Pt not on IV antibiotic at present.

## 2024-01-23 NOTE — PROGRESS NOTES
Physical Therapy Med Surg Daily Treatment Note  Facility/Department: [facility]  Room: [facility]/[place] [address]       NAME: Nena Christian  : 1960 (63 y.o.)  MRN: [id number]  CODE STATUS: Full Code    Date of Service: 2024    Patient Diagnosis(es): Sacral pain [M53.3]   No chief complaint on file.    Patient Active Problem List    Diagnosis Date Noted    Abnormal CT scan 2024    Buttock pain 2024    Sacral back pain 2024    Cellulitis and abscess of buttock 2024        Past Medical History:   Diagnosis Date    Hyperlipidemia     Thyroid disease      No past surgical history on file.    Chart Reviewed: Yes  Family / Caregiver Present: No  Diagnosis: Sacral pain    Restrictions:  Restrictions/Precautions: Fall Risk;Up as Tolerated    SUBJECTIVE:   Subjective: Patient resting in bed. Agreeable to tx.  Response To Previous Treatment: Patient with no complaints from previous session.    Pain  Patient denies pain pre/post session     OBJECTIVE:     Bed mobility  Supine to Sit: Stand by assistance  Sit to Supine: Stand by assistance  Scooting: Stand by assistance    Transfers  Sit to Stand: Stand by assistance  Stand to Sit: Stand by assistance  Bed to Chair: Stand by assistance  Comment: Guarded; requires increased time to complete due to pain    Ambulation  Surface: Level tile  Device: No Device  Assistance: Stand by assistance  Quality of Gait: antalgia- poor tolerance due to pain, increased lateral sway  Distance: 20 feet  More Ambulation?: Yes  Ambulation 2  Device 2: Rolling Walker  Assistance 2: Supervision  Quality of Gait 2: reciprocal antalgic gait, no LOB, flexed posture  Distance: 100 feet    Exercises:   Single steps forward at ww x10   March in place at ww x10      ASSESSMENT   Body Structures, Functions, Activity Limitations Requiring Skilled Therapeutic Intervention: Decreased functional mobility ;Decreased safe awareness;Decreased ADL status;Decreased tolerance to work

## 2024-01-23 NOTE — PROGRESS NOTES
Infectious Disease     Patient Name: Nena Christian  Date: 1/23/2024  YOB: 1960  Medical Record Number: [id number]              History of Present Illness:  hyperlipidemia, hypothyroidism, colon cancer status post chemoradiation 20 years ago, osteomyelitis in the pubic ramus and pubic symphysis area about 10 years ago   At diagnosis of metastatic anal cancer treated with chemotherapy radiation  Achieved complete remission    Recurrent cellulitis 2014 recurring every 6 to 7 months in the lower abdomen buttocks region down legs    Hospitalized 2018 in [facility] bilateral medial thigh cellulitis  Treated with IV cefazolin and oral cefadroxil      pain in both hips and sacral region radiates to the right lower extremity   no fevers, chills           [phone] 1/22/2024      Narrative & Impression  EXAMINATION:  CT OF THE PELVIS WITHOUT CONTRAST 1/21/2024 11:13 pm     TECHNIQUE:  CT of the pelvis was performed without the administration of intravenous  contrast.  Multiplanar reformatted images are provided for review.  Adjustment of mA and/or kV according to patient size was utilized.  Automated  exposure control, iterative reconstruction, and/or weight based adjustment of  the mA/kV was utilized to reduce the radiation dose to as low as reasonably  achievable.     COMPARISON:  None.     HISTORY  ORDERING SYSTEM PROVIDED HISTORY: low back and sacral pain, hx osteomyelitis  TECHNOLOGIST PROVIDED HISTORY:  Reason for exam:->low back and sacral pain, hx osteomyelitis  Additional Contrast?->None  Decision Support Exception - unselect if not a suspected or confirmed  emergency medical condition->Emergency Medical Condition (MA)  What reading provider will be dictating this exam?->CRC     FINDINGS:  Bones: Lucency with surrounding serpiginous sclerosis seen in the right  sacral ala.  There is no acute cortical disruption.  There is no erosion or  cortical destruction.     Soft Tissue: An elliptical

## 2024-01-23 NOTE — PLAN OF CARE
Patient progressing towards discharge    Problem: Discharge Planning  Goal: Discharge to home or other facility with appropriate resources  1/23/2024 1014 by Kate Scherer RN  Outcome: Progressing  Flowsheets (Taken 1/23/2024 0800)  Discharge to home or other facility with appropriate resources: Identify barriers to discharge with patient and caregiver  1/22/2024 2128 by Skyla Ram RN  Outcome: Progressing  Flowsheets (Taken 1/22/2024 0800 by Cecile Truong RN)  Discharge to home or other facility with appropriate resources: Identify barriers to discharge with patient and caregiver     Problem: Safety - Adult  Goal: Free from fall injury  1/23/2024 1014 by Kate Scherer RN  Outcome: Progressing  1/22/2024 2128 by Skyla Ram RN  Outcome: Progressing     Problem: ABCDS Injury Assessment  Goal: Absence of physical injury  1/23/2024 1014 by Kate Scherer RN  Outcome: Progressing  1/22/2024 2128 by Skyla Ram RN  Outcome: Progressing     Problem: Pain  Goal: Verbalizes/displays adequate comfort level or baseline comfort level  1/23/2024 1014 by Kate Scherer RN  Outcome: Progressing  1/22/2024 2128 by Skyla Ram RN  Outcome: Progressing

## 2024-01-23 NOTE — PROGRESS NOTES
Physician Progress Note    1/23/2024   4:04 PM    Name:  Nena Christian  MRN:    [id number]      Day: 1     Admit Date: 1/22/2024  3:47 AM  PCP: Chon Jo MD    Code Status:  Full Code    Subjective:     Pain improved with medication but is still present.    Current Facility-Administered Medications   Medication Dose Route Frequency Provider Last Rate Last Admin    sodium chloride flush 0.9 % injection 5-40 mL  5-40 mL IntraVENous 2 times per day Mario Underwood MD   10 mL at 01/23/24 0746    sodium chloride flush 0.9 % injection 5-40 mL  5-40 mL IntraVENous PRN Mario Underwood MD        0.9 % sodium chloride infusion   IntraVENous PRN Mario Underwood MD        potassium chloride (KLOR-CON M) extended release tablet 40 mEq  40 mEq Oral PRN Mario Underwood MD        Or    potassium bicarb-citric acid (EFFER-K) effervescent tablet 40 mEq  40 mEq Oral PRN Mario Underwood MD        Or    potassium chloride 10 mEq/100 mL IVPB (Peripheral Line)  10 mEq IntraVENous PRN Mario Underwood MD        magnesium sulfate 2000 mg in 50 mL IVPB premix  2,000 mg IntraVENous PRN Mario Underwood MD        enoxaparin (LOVENOX) injection 40 mg  40 mg SubCUTAneous Daily Mario Underwood MD        ondansetron (ZOFRAN-ODT) disintegrating tablet 4 mg  4 mg Oral Q8H PRN Mario Underwood MD        Or    ondansetron (ZOFRAN) injection 4 mg  4 mg IntraVENous Q6H PRN Mario Underwood MD        polyethylene glycol (GLYCOLAX) packet 17 g  17 g Oral Daily PRN Mario Underwood MD        amitriptyline (ELAVIL) tablet 25 mg  25 mg Oral Nightly Ivana Tao R, DO   25 mg at 01/22/24 1956    DULoxetine (CYMBALTA) extended release capsule 60 mg  60 mg Oral Daily Ivana, Tao R, DO   60 mg at 01/23/24 0745    levothyroxine (SYNTHROID) tablet 50 mcg  50 mcg Oral Daily Ivana Tao R, DO   50 mcg at 01/23/24 0745    pregabalin (LYRICA) capsule 200 mg  200 mg Oral BID Toa Heath DO   200 mg at 01/23/24 0747    atorvastatin (LIPITOR) tablet 10

## 2024-01-23 NOTE — CONSULTS
Vascular Medicine and Interventional Radiology    Date of Consultation:2024    Nena Christian  : 1960  MR #: [id number]    Consultant:RASHIDA Albright - CARMELA  Consulting Physician: Dr. Guillermo Cross, Vascular Medicine and Interventional Radiology     Consult Ordered By: Dr. Underwood            PCP:  Chon Jo MD     Attending Physician: Tao Heath DO     Date of Admission: 2024  3:47 AM     Chief Complaint: No chief complaint on file.     Reason for Consultation: Large fluid collection extending into the gluteal region    History of Present Illness: 63-year-old female admitted from [facility], ER with low back/sacral/buttock discomfort.  She reports the pain is from \"hip to hip.\" History of prior colorectal cancer treated with radiation.  History of issues in the past with cellulitis in the buttocks area/groin in  and 2018 with osteomyelitis of the pubic ramus and pubic symphysis area.  Additionally with history of chronic back pain/sciatica.  CT with lucency with surrounding serpiginous sclerosis seen in right sacral area, could reflect subacute/chronic insufficiency fracture, chronic dysmorphism that it getting sepsis with osseous hypertrophy and joint space widening, likely the sequela of chronic osteomyelitis, osteoarthritic related sclerosis seen in bilateral sacroiliac articulations, nonspecific moderate thick-walled 9.6 cm left occult fluid collection left gluteal soft tissues  No fever since admission.  However patient did report subjective chills at home.  She denies nausea/vomiting, abdominal pain.  Today, WBC 7.6, Hgb 14.0, platelets 303.  She was placed on Lovenox for DVT prophylaxis, not given this morning (patient declined).   She is not on anticoagulation/antiplatelet therapy at home.    Past Medical History:   has a past medical history of Hyperlipidemia and Thyroid disease.    Past SurgicalHistory:   has no past surgical history on file.     Allergies:Dye 
  Soft Tissue: An elliptical moderately thick-walled collection is seen in the  left gluteal soft tissues, measuring approximately 9.6 x 2.5 cm.  Adjacent  surgical clips are observed.  There is generalized muscular atrophy     Joint: There is chronic dysmorphism centered at the symphysis pubis, with  osseous hypertrophy and chronic joint space widening.  Osteo arthritic  related sclerosis is seen in the bilateral sacroiliac articulations as well.     IMPRESSION:  1. Lucency with surrounding serpiginous sclerosis seen in the right sacral  ala. This could reflect a subacute or chronic insufficiency fracture. No  acute cortical disruption.  2. Chronic dysmorphism centered at the symphysis pubis with osseous  hypertrophy and chronic joint space widening, likely the sequelae of chronic  osteomyelitis, supported by history.  3. Osteoarthritic related sclerosis is seen in the bilateral sacroiliac  articulations.  4. Nonspecific moderately thick-walled 9.6 cm left occult fluid collection in  the left gluteal soft tissues              Specimen Collected: 01/22/24 09:01 EST Last Resulted: 01/22/24 09:04 EST                 [phone] 1/22/2024      Narrative & Impression  EXAMINATION:  CT OF THE LUMBAR SPINE WITHOUT CONTRAST  1/21/2024     TECHNIQUE:  CT of the lumbar spine was performed without the administration of  intravenous contrast. Multiplanar reformatted images are provided for review.  Adjustment of mA and/or kV according to patient size was utilized.  Automated  exposure control, iterative reconstruction, and/or weight based adjustment of  the mA/kV was utilized to reduce the radiation dose to as low as reasonably  achievable.     COMPARISON:  None     HISTORY:  ORDERING SYSTEM PROVIDED HISTORY: low back pain, hx osteomyelitis  TECHNOLOGIST PROVIDED HISTORY:  Reason for exam:->low back pain, hx osteomyelitis  Decision Support Exception - unselect if not a suspected or confirmed  emergency medical 
IntraVENous, PRN  enoxaparin (LOVENOX) injection 40 mg, 40 mg, SubCUTAneous, Daily  ondansetron (ZOFRAN-ODT) disintegrating tablet 4 mg, 4 mg, Oral, Q8H PRN **OR** ondansetron (ZOFRAN) injection 4 mg, 4 mg, IntraVENous, Q6H PRN  polyethylene glycol (GLYCOLAX) packet 17 g, 17 g, Oral, Daily PRN  amitriptyline (ELAVIL) tablet 25 mg, 25 mg, Oral, Nightly  DULoxetine (CYMBALTA) extended release capsule 60 mg, 60 mg, Oral, Daily  levothyroxine (SYNTHROID) tablet 50 mcg, 50 mcg, Oral, Daily  pregabalin (LYRICA) capsule 200 mg, 200 mg, Oral, BID  atorvastatin (LIPITOR) tablet 10 mg, 10 mg, Oral, QHS  acetaminophen (TYLENOL) tablet 650 mg, 650 mg, Oral, TID  ketorolac (TORADOL) injection 15 mg, 15 mg, IntraVENous, q8h  oxyCODONE (ROXICODONE) immediate release tablet 5 mg, 5 mg, Oral, Q4H PRN  cyclobenzaprine (FLEXERIL) tablet 10 mg, 10 mg, Oral, TID PRN  lidocaine 4 % external patch 1 patch, 1 patch, TransDERmal, Daily  nicotine (NICODERM CQ) 21 MG/24HR 1 patch, 1 patch, TransDERmal, Daily  Allergies:  Dye [iodides], Erythromycin, Sulfa antibiotics, Tetanus toxoids, and Zinc    Social History:   TOBACCO:   reports that she has been smoking cigarettes. She has never used smokeless tobacco.  ETOH:   reports no history of alcohol use.  DRUGS:   reports current drug use. Drug: Marijuana (Weed).  Family History:   No family history on file.    REVIEW OF SYSTEMS:    CONSTITUTIONAL:  negative  EYES:  negative  HEENT:  negative  RESPIRATORY:  negative for  dyspnea and wheezing  CARDIOVASCULAR:  negative for  chest pain, palpitations  GASTROINTESTINAL:  negative for nausea, vomiting, change in bowel habits, diarrhea, and constipation  INTEGUMENT/BREAST:  negative  HEMATOLOGIC/LYMPHATIC:  negative  MUSCULOSKELETAL:  positive for  myalgias, arthralgias, pain, joint swelling, stiff joints, and decreased range of motion  NEUROLOGICAL:  negative  BEHAVIOR/PSYCH:  negative    PHYSICAL EXAM:    VITALS:  BP (!) 123/50   Pulse 66   Temp 98 
33.4 01/23/2024 03:50 AM    RDW 12.6 01/23/2024 03:50 AM    LYMPHOPCT 19.9 01/23/2024 03:50 AM    MONOPCT 14.0 01/23/2024 03:50 AM    EOSPCT 2.1 04/12/2014 12:00 AM    BASOPCT 1.1 01/23/2024 03:50 AM    MONOSABS 0.6 01/23/2024 03:50 AM    LYMPHSABS 0.9 01/23/2024 03:50 AM    EOSABS 0.3 01/23/2024 03:50 AM    BASOSABS 0.1 01/23/2024 03:50 AM     BMP:    Lab Results   Component Value Date/Time     01/23/2024 03:50 AM    K 3.8 01/23/2024 03:50 AM     01/23/2024 03:50 AM    CO2 27 01/23/2024 03:50 AM    BUN 19 01/23/2024 03:50 AM    LABALBU 3.5 01/23/2024 03:50 AM    LABALBU 4.0 11/25/2011 11:08 PM    CREATININE 1.04 01/23/2024 03:50 AM    CALCIUM 8.6 01/23/2024 03:50 AM    GFRAA 69.7 11/25/2011 11:08 PM    LABGLOM 59.7 01/23/2024 03:50 AM    GLUCOSE 96 01/23/2024 03:50 AM    GLUCOSE 105 11/25/2011 11:08 PM       ASSESSMENT AND PLAN    Will stop IV Morphine, will wait for surgery findings, Discussed Facet blocks vs SI injections, Discussed IT pump if there is cancer.

## 2024-01-24 NOTE — PLAN OF CARE
Patient to discharge home    Problem: Discharge Planning  Goal: Discharge to home or other facility with appropriate resources  1/23/2024 1906 by Kate Scherer RN  Outcome: Adequate for Discharge  1/23/2024 1014 by Kate Scherer RN  Outcome: Progressing  Flowsheets (Taken 1/23/2024 0800)  Discharge to home or other facility with appropriate resources: Identify barriers to discharge with patient and caregiver     Problem: Safety - Adult  Goal: Free from fall injury  1/23/2024 1906 by Kate Scherer RN  Outcome: Adequate for Discharge  1/23/2024 1014 by Kate Scherer RN  Outcome: Progressing     Problem: ABCDS Injury Assessment  Goal: Absence of physical injury  1/23/2024 1906 by Kate Scherer RN  Outcome: Adequate for Discharge  1/23/2024 1014 by Kate Scherer RN  Outcome: Progressing     Problem: Pain  Goal: Verbalizes/displays adequate comfort level or baseline comfort level  1/23/2024 1906 by Kate Scherer RN  Outcome: Adequate for Discharge  1/23/2024 1014 by Kate Scherer RN  Outcome: Progressing

## 2024-01-24 NOTE — FLOWSHEET NOTE
Home going instruction reviewed with patient-pt aware of new RX for Flexeril and voiced understanding on administration. IV d/william from LT AC with catheter intact. No active bleeding noted.  Pt placed in transport.

## 2024-01-24 NOTE — DISCHARGE SUMMARY
[facility] Medicine Discharge Summary    Nena Christian  :  1960  MRN:  [id number]    Admit date:  2024  Discharge date:  2024    Admitting Physician:  Mario Underwood MD  Primary Care Physician:  Chon Jo MD    Discharge Diagnoses:    Principal Problem:    Sacral back pain  Active Problems:    Cellulitis and abscess of buttock    Abnormal CT scan    Buttock pain  Resolved Problems:    * No resolved hospital problems. *    No chief complaint on file.      Condition: improved   Activity: no restrictions   Diet: regular  Disposition: home  Functional Status: ambulatory    Significant Findings:     CT L Spine:  11.3 cm thick wall elliptical fluid collection in the left paraspinal soft tissues.  This may represent a seroma, however superimposed infection is not  Excluded    CT Pelvis:  1. Lucency with surrounding serpiginous sclerosis seen in the right sacral ala. This could reflect a subacute or chronic insufficiency fracture. No acute cortical disruption.  2. Chronic dysmorphism centered at the symphysis pubis with osseous hypertrophy and chronic joint space widening, likely the sequelae of chronic osteomyelitis, supported by history.  3. Osteoarthritic related sclerosis is seen in the bilateral sacroiliac articulations.  4. Nonspecific moderately thick-walled 9.6 cm left occult fluid collection in the left gluteal soft tissues      Hospital Course:   64yo F with PMH remote colorectal cancer, pubic osteomyelitis (~10y ago) presented as transfer from [facility] ED with back pain radiating down her leg. She was found to have abnormal CT findings and sent to [facility] for specialist evaluation.     IR attempted aspiration however nothing could be aspirated- it was felt to be either too small or scar. The imaging was also reviewed by both neurosurgery and general surgery- both did not recommend any intervention. General surgery reviewed CT scans back to 2018 and felt

## 2024-01-28 LAB
BACTERIA BLD CULT ORG #2: NORMAL
BACTERIA BLD CULT: NORMAL

## 2024-06-26 ENCOUNTER — APPOINTMENT (OUTPATIENT)
Dept: RADIOLOGY | Facility: HOSPITAL | Age: 64
End: 2024-06-26
Payer: MEDICAID

## 2024-06-26 ENCOUNTER — APPOINTMENT (OUTPATIENT)
Dept: CARDIOLOGY | Facility: HOSPITAL | Age: 64
End: 2024-06-26
Payer: MEDICAID

## 2024-06-26 ENCOUNTER — HOSPITAL ENCOUNTER (EMERGENCY)
Facility: HOSPITAL | Age: 64
Discharge: HOME | End: 2024-06-26
Attending: EMERGENCY MEDICINE
Payer: MEDICAID

## 2024-06-26 VITALS
RESPIRATION RATE: 18 BRPM | SYSTOLIC BLOOD PRESSURE: 170 MMHG | TEMPERATURE: 96.8 F | DIASTOLIC BLOOD PRESSURE: 65 MMHG | HEART RATE: 58 BPM | OXYGEN SATURATION: 98 %

## 2024-06-26 DIAGNOSIS — M54.14 THORACIC RADICULOPATHY: Primary | ICD-10-CM

## 2024-06-26 LAB
ALBUMIN SERPL BCP-MCNC: 3.8 G/DL (ref 3.4–5)
ALP SERPL-CCNC: 84 U/L (ref 33–136)
ALT SERPL W P-5'-P-CCNC: 9 U/L (ref 7–45)
ANION GAP SERPL CALC-SCNC: 11 MMOL/L (ref 10–20)
AST SERPL W P-5'-P-CCNC: 12 U/L (ref 9–39)
BASOPHILS # BLD AUTO: 0.04 X10*3/UL (ref 0–0.1)
BASOPHILS NFR BLD AUTO: 0.5 %
BILIRUB DIRECT SERPL-MCNC: 0.1 MG/DL (ref 0–0.3)
BILIRUB SERPL-MCNC: 0.8 MG/DL (ref 0–1.2)
BUN SERPL-MCNC: 9 MG/DL (ref 6–23)
CALCIUM SERPL-MCNC: 9.3 MG/DL (ref 8.6–10.3)
CARDIAC TROPONIN I PNL SERPL HS: 6 NG/L (ref 0–13)
CARDIAC TROPONIN I PNL SERPL HS: 7 NG/L (ref 0–13)
CHLORIDE SERPL-SCNC: 107 MMOL/L (ref 98–107)
CO2 SERPL-SCNC: 28 MMOL/L (ref 21–32)
CREAT SERPL-MCNC: 0.88 MG/DL (ref 0.5–1.05)
EGFRCR SERPLBLD CKD-EPI 2021: 73 ML/MIN/1.73M*2
EOSINOPHIL # BLD AUTO: 0.11 X10*3/UL (ref 0–0.7)
EOSINOPHIL NFR BLD AUTO: 1.3 %
ERYTHROCYTE [DISTWIDTH] IN BLOOD BY AUTOMATED COUNT: 13.3 % (ref 11.5–14.5)
GLUCOSE SERPL-MCNC: 101 MG/DL (ref 74–99)
HCT VFR BLD AUTO: 43.7 % (ref 36–46)
HGB BLD-MCNC: 14.9 G/DL (ref 12–16)
HOLD SPECIMEN: NORMAL
IMM GRANULOCYTES # BLD AUTO: 0.02 X10*3/UL (ref 0–0.7)
IMM GRANULOCYTES NFR BLD AUTO: 0.2 % (ref 0–0.9)
LYMPHOCYTES # BLD AUTO: 0.8 X10*3/UL (ref 1.2–4.8)
LYMPHOCYTES NFR BLD AUTO: 9.5 %
MCH RBC QN AUTO: 32.4 PG (ref 26–34)
MCHC RBC AUTO-ENTMCNC: 34.1 G/DL (ref 32–36)
MCV RBC AUTO: 95 FL (ref 80–100)
MONOCYTES # BLD AUTO: 0.94 X10*3/UL (ref 0.1–1)
MONOCYTES NFR BLD AUTO: 11.1 %
NEUTROPHILS # BLD AUTO: 6.55 X10*3/UL (ref 1.2–7.7)
NEUTROPHILS NFR BLD AUTO: 77.4 %
NRBC BLD-RTO: 0 /100 WBCS (ref 0–0)
PLATELET # BLD AUTO: 284 X10*3/UL (ref 150–450)
POTASSIUM SERPL-SCNC: 3.9 MMOL/L (ref 3.5–5.3)
PROT SERPL-MCNC: 6.6 G/DL (ref 6.4–8.2)
RBC # BLD AUTO: 4.6 X10*6/UL (ref 4–5.2)
SODIUM SERPL-SCNC: 142 MMOL/L (ref 136–145)
WBC # BLD AUTO: 8.5 X10*3/UL (ref 4.4–11.3)

## 2024-06-26 PROCEDURE — 85025 COMPLETE CBC W/AUTO DIFF WBC: CPT | Performed by: EMERGENCY MEDICINE

## 2024-06-26 PROCEDURE — 71045 X-RAY EXAM CHEST 1 VIEW: CPT

## 2024-06-26 PROCEDURE — 96375 TX/PRO/DX INJ NEW DRUG ADDON: CPT

## 2024-06-26 PROCEDURE — 80048 BASIC METABOLIC PNL TOTAL CA: CPT | Performed by: EMERGENCY MEDICINE

## 2024-06-26 PROCEDURE — 2500000004 HC RX 250 GENERAL PHARMACY W/ HCPCS (ALT 636 FOR OP/ED): Performed by: EMERGENCY MEDICINE

## 2024-06-26 PROCEDURE — 36415 COLL VENOUS BLD VENIPUNCTURE: CPT | Performed by: EMERGENCY MEDICINE

## 2024-06-26 PROCEDURE — 84484 ASSAY OF TROPONIN QUANT: CPT | Performed by: EMERGENCY MEDICINE

## 2024-06-26 PROCEDURE — 82248 BILIRUBIN DIRECT: CPT | Performed by: EMERGENCY MEDICINE

## 2024-06-26 PROCEDURE — 96374 THER/PROPH/DIAG INJ IV PUSH: CPT

## 2024-06-26 PROCEDURE — 99284 EMERGENCY DEPT VISIT MOD MDM: CPT | Mod: 25

## 2024-06-26 PROCEDURE — 93005 ELECTROCARDIOGRAM TRACING: CPT

## 2024-06-26 PROCEDURE — 2500000001 HC RX 250 WO HCPCS SELF ADMINISTERED DRUGS (ALT 637 FOR MEDICARE OP): Performed by: EMERGENCY MEDICINE

## 2024-06-26 RX ORDER — CYCLOBENZAPRINE HCL 10 MG
10 TABLET ORAL 3 TIMES DAILY PRN
Qty: 15 TABLET | Refills: 0 | Status: SHIPPED | OUTPATIENT
Start: 2024-06-26 | End: 2024-07-01

## 2024-06-26 RX ORDER — KETOROLAC TROMETHAMINE 30 MG/ML
15 INJECTION, SOLUTION INTRAMUSCULAR; INTRAVENOUS ONCE
Status: COMPLETED | OUTPATIENT
Start: 2024-06-26 | End: 2024-06-26

## 2024-06-26 RX ORDER — METHYLPREDNISOLONE 4 MG/1
4 TABLET ORAL DAILY
Qty: 7 TABLET | Refills: 0 | Status: SHIPPED | OUTPATIENT
Start: 2024-06-26 | End: 2024-07-03

## 2024-06-26 RX ORDER — CYCLOBENZAPRINE HCL 10 MG
5 TABLET ORAL ONCE
Status: COMPLETED | OUTPATIENT
Start: 2024-06-26 | End: 2024-06-26

## 2024-06-26 RX ORDER — NAPROXEN 500 MG/1
500 TABLET ORAL
Qty: 30 TABLET | Refills: 0 | Status: SHIPPED | OUTPATIENT
Start: 2024-06-26 | End: 2024-07-11

## 2024-06-26 ASSESSMENT — PAIN SCALES - GENERAL
PAINLEVEL_OUTOF10: 10 - WORST POSSIBLE PAIN
PAINLEVEL_OUTOF10: 10 - WORST POSSIBLE PAIN

## 2024-06-26 ASSESSMENT — LIFESTYLE VARIABLES
HAVE YOU EVER FELT YOU SHOULD CUT DOWN ON YOUR DRINKING: NO
HAVE PEOPLE ANNOYED YOU BY CRITICIZING YOUR DRINKING: NO
EVER FELT BAD OR GUILTY ABOUT YOUR DRINKING: NO
EVER HAD A DRINK FIRST THING IN THE MORNING TO STEADY YOUR NERVES TO GET RID OF A HANGOVER: NO
TOTAL SCORE: 0

## 2024-06-26 ASSESSMENT — COLUMBIA-SUICIDE SEVERITY RATING SCALE - C-SSRS
1. IN THE PAST MONTH, HAVE YOU WISHED YOU WERE DEAD OR WISHED YOU COULD GO TO SLEEP AND NOT WAKE UP?: NO
6. HAVE YOU EVER DONE ANYTHING, STARTED TO DO ANYTHING, OR PREPARED TO DO ANYTHING TO END YOUR LIFE?: NO
2. HAVE YOU ACTUALLY HAD ANY THOUGHTS OF KILLING YOURSELF?: NO

## 2024-06-26 ASSESSMENT — PAIN - FUNCTIONAL ASSESSMENT
PAIN_FUNCTIONAL_ASSESSMENT: 0-10
PAIN_FUNCTIONAL_ASSESSMENT: 0-10

## 2024-06-26 NOTE — ED PROVIDER NOTES
HPI   Chief Complaint   Patient presents with    Back Pain       Patient is a 64-year-old female with history of rectal cancer hypercholesterolemia comes in by EMS complaining of pain in the left shoulder blade going up to her shoulder and the neck.  As per the patient this pain gets worse if she moves around, started after she was helping a friend move her belongings, no shortness of breath no fever no chills                          Johnson Coma Scale Score: 15                     Patient History   No past medical history on file.  No past surgical history on file.  No family history on file.  Social History     Tobacco Use    Smoking status: Not on file    Smokeless tobacco: Not on file   Substance Use Topics    Alcohol use: Not on file    Drug use: Not on file       Physical Exam   ED Triage Vitals [06/26/24 1155]   Temperature Heart Rate Respirations BP   36 °C (96.8 °F) 60 18 152/69      Pulse Ox Temp Source Heart Rate Source Patient Position   97 % Temporal Monitor Sitting      BP Location FiO2 (%)     Right arm --       Physical Exam  Vitals reviewed.   Constitutional:       Appearance: Normal appearance.   Cardiovascular:      Rate and Rhythm: Normal rate and regular rhythm.   Pulmonary:      Effort: Pulmonary effort is normal.      Breath sounds: Normal breath sounds.   Abdominal:      Palpations: Abdomen is soft.   Musculoskeletal:         General: Tenderness present.      Cervical back: Normal range of motion.      Comments: Tenderness palpation left scapula,   Skin:     General: Skin is warm and dry.   Neurological:      General: No focal deficit present.      Mental Status: She is alert and oriented to person, place, and time.         ED Course & MDM   Diagnoses as of 06/26/24 1416   Thoracic radiculopathy       Medical Decision Making  Patient's EKG was interpreted by me as normal sinus rhythm rate of 60 normal QRS Q waves in inferior leads  Differential diagnosis  Coronary syndrome, musculoskeletal  strain, radiculopathy,  Ordered as cardiac workup in the place including EKG and further workup management be done based upon results of the test ordered  At this time patient CBC chemistries cardiac enzymes were unremarkable patient symptoms have been going on for 4 days, no shortness of breath no tachycardia unlikely to be pulmonary embolism are aortic dissection as pain has been slowly progressing started when she was helping her friend move, at this time patient reassured and discharged to home.  Labs Reviewed  CBC WITH AUTO DIFFERENTIAL - Abnormal     WBC                           8.5                    nRBC                          0.0                    RBC                           4.60                   Hemoglobin                    14.9                   Hematocrit                    43.7                   MCV                           95                     MCH                           32.4                   MCHC                          34.1                   RDW                           13.3                   Platelets                     284                    Neutrophils %                 77.4                   Immature Granulocytes %, Automated   0.2                    Lymphocytes %                 9.5                    Monocytes %                   11.1                   Eosinophils %                 1.3                    Basophils %                   0.5                    Neutrophils Absolute          6.55                   Immature Granulocytes Absolute, Au*   0.02                   Lymphocytes Absolute          0.80 (*)               Monocytes Absolute            0.94                   Eosinophils Absolute          0.11                   Basophils Absolute            0.04                BASIC METABOLIC PANEL - Abnormal     Glucose                       101 (*)                Sodium                        142                    Potassium                     3.9                    Chloride                       107                    Bicarbonate                   28                     Anion Gap                     11                     Urea Nitrogen                 9                      Creatinine                    0.88                   eGFR                          73                     Calcium                       9.3                 HEPATIC FUNCTION PANEL - Normal     Albumin                       3.8                    Bilirubin, Total              0.8                    Bilirubin, Direct             0.1                    Alkaline Phosphatase          84                     ALT                           9                      AST                           12                     Total Protein                 6.6                 SERIAL TROPONIN-INITIAL - Normal     Troponin I, High Sensitivity   6                        Narrative: Less than 99th percentile of normal range cutoff-                Female and children under 18 years old <14 ng/L; Male <21 ng/L: Negative                Repeat testing should be performed if clinically indicated.                                 Female and children under 18 years old 14-50 ng/L; Male 21-50 ng/L:                Consistent with possible cardiac damage and possible increased clinical                 risk. Serial measurements may help to assess extent of myocardial damage.                                 >50 ng/L: Consistent with cardiac damage, increased clinical risk and                myocardial infarction. Serial measurements may help assess extent of                 myocardial damage.                                  NOTE: Children less than 1 year old may have higher baseline troponin                 levels and results should be interpreted in conjunction with the overall                 clinical context.                                 NOTE: Troponin I testing is performed using a different                 testing methodology at [facility]  [place] than at other                 system hospitals. Direct result comparisons should only                 be made within the same method.  TROPONIN SERIES- (INITIAL, 1 HR)       Narrative: The following orders were created for panel order Troponin I Series, High Sensitivity (0, 1 HR).                Procedure                               Abnormality         Status                                   ---------                               -----------         ------                                   Troponin I, High Sensiti...[[id number]]  Normal              Final result                             Troponin, High Sensitivi...[[id number]]                                                                               Please view results for these tests on the individual orders.  SERIAL TROPONIN, 1 HOUR     XR chest 1 view   Final Result    Bilateral basilar atelectasis          MACRO:    None.          Signed by: Christa Lynch 6/26/2024 12:46 PM    Dictation workstation:   [id number]              Procedure  Procedures     Mike Jiménez MD  06/26/24 1313       Mike Jiménez MD  06/26/24 1347       Mike Jiménez MD  06/26/24 1416

## 2024-06-29 LAB
ATRIAL RATE: 60 BPM
P AXIS: 42 DEGREES
P OFFSET: 189 MS
P ONSET: 134 MS
PR INTERVAL: 162 MS
Q ONSET: 215 MS
QRS COUNT: 10 BEATS
QRS DURATION: 100 MS
QT INTERVAL: 434 MS
QTC CALCULATION(BAZETT): 434 MS
QTC FREDERICIA: 434 MS
R AXIS: 6 DEGREES
T AXIS: 11 DEGREES
T OFFSET: 432 MS
VENTRICULAR RATE: 60 BPM

## 2024-10-16 NOTE — ED NOTES
Transfer Center called back with eta for transport. [facility] eta 0700. Transfer Center to attempt to find something sooner.   Never

## 2025-01-17 ENCOUNTER — HOSPITAL ENCOUNTER (EMERGENCY)
Facility: HOSPITAL | Age: 65
Discharge: HOME | End: 2025-01-17
Payer: MEDICARE

## 2025-01-17 ENCOUNTER — HOSPITAL ENCOUNTER (EMERGENCY)
Facility: HOSPITAL | Age: 65
Discharge: STILL ADMITTED OR ADMINISTRATIVE | End: 2025-01-17
Payer: MEDICARE

## 2025-01-17 ENCOUNTER — APPOINTMENT (OUTPATIENT)
Dept: RADIOLOGY | Facility: HOSPITAL | Age: 65
End: 2025-01-17
Payer: MEDICARE

## 2025-01-17 VITALS
BODY MASS INDEX: 30.82 KG/M2 | OXYGEN SATURATION: 98 % | DIASTOLIC BLOOD PRESSURE: 58 MMHG | HEIGHT: 65 IN | HEART RATE: 87 BPM | RESPIRATION RATE: 18 BRPM | TEMPERATURE: 96.8 F | WEIGHT: 185 LBS | SYSTOLIC BLOOD PRESSURE: 115 MMHG

## 2025-01-17 DIAGNOSIS — J20.8 VIRAL BRONCHITIS: Primary | ICD-10-CM

## 2025-01-17 LAB
FLUAV RNA RESP QL NAA+PROBE: NOT DETECTED
FLUBV RNA RESP QL NAA+PROBE: NOT DETECTED
RSV RNA RESP QL NAA+PROBE: NOT DETECTED
S PYO DNA THROAT QL NAA+PROBE: NOT DETECTED
SARS-COV-2 RNA RESP QL NAA+PROBE: NOT DETECTED

## 2025-01-17 PROCEDURE — 71045 X-RAY EXAM CHEST 1 VIEW: CPT

## 2025-01-17 PROCEDURE — 87637 SARSCOV2&INF A&B&RSV AMP PRB: CPT

## 2025-01-17 PROCEDURE — 71045 X-RAY EXAM CHEST 1 VIEW: CPT | Mod: FOREIGN READ | Performed by: RADIOLOGY

## 2025-01-17 PROCEDURE — 99284 EMERGENCY DEPT VISIT MOD MDM: CPT | Mod: 25

## 2025-01-17 PROCEDURE — 4500999001 HC ED NO CHARGE

## 2025-01-17 PROCEDURE — 87651 STREP A DNA AMP PROBE: CPT

## 2025-01-17 RX ORDER — OXYMETAZOLINE HCL 0.05 %
2 SPRAY, NON-AEROSOL (ML) NASAL EVERY 12 HOURS PRN
Qty: 30 ML | Refills: 0 | Status: SHIPPED | OUTPATIENT
Start: 2025-01-17 | End: 2025-01-19

## 2025-01-17 RX ORDER — BENZOCAINE AND MENTHOL, UNSPECIFIED FORM 15; 2.3 MG/1; MG/1
1 LOZENGE ORAL
Qty: 84 LOZENGE | Refills: 0 | Status: SHIPPED | OUTPATIENT
Start: 2025-01-17 | End: 2025-01-24

## 2025-01-17 ASSESSMENT — PAIN SCALES - GENERAL: PAINLEVEL_OUTOF10: 7

## 2025-01-17 ASSESSMENT — COLUMBIA-SUICIDE SEVERITY RATING SCALE - C-SSRS
6. HAVE YOU EVER DONE ANYTHING, STARTED TO DO ANYTHING, OR PREPARED TO DO ANYTHING TO END YOUR LIFE?: NO
2. HAVE YOU ACTUALLY HAD ANY THOUGHTS OF KILLING YOURSELF?: NO
1. IN THE PAST MONTH, HAVE YOU WISHED YOU WERE DEAD OR WISHED YOU COULD GO TO SLEEP AND NOT WAKE UP?: NO

## 2025-01-17 ASSESSMENT — PAIN - FUNCTIONAL ASSESSMENT: PAIN_FUNCTIONAL_ASSESSMENT: 0-10

## 2025-01-17 NOTE — ED PROVIDER NOTES
HPI   Chief Complaint   Patient presents with    Flu Symptoms     Was seen at PCP and sent her for xray       History provided by: Patient    Limitations to history: None    CC: Flulike symptoms    HPI: 64-year-old female with a history of anal cancer, cervical radiculopathy, hypothyroidism, iron deficient anemia, peptic ulcer disease, migraines presents the emergency department to be evaluated for flulike symptoms.  Patient states that she woke up this morning feeling generally unwell.  She reports low-grade fevers, she is not been taking anything for the fevers prior to arrival.  She reports a productive yellow cough, runny nose and sore throat, congestion and rhinorrhea.  Denies body aches or chills.  Denies chest pain or shortness of breath.  Denies history of CAD, CHF, asthma, PE or DVT.  She denies weakness and fatigue.  Denies all GI and  complaints.  She presented to her primary care provider's office today who recommended that she come to the emergency department for an x-ray of her chest.  Patient informing that in late December she did have pneumonia and was treated with prednisone and Augmentin.  She was doing well until her symptoms started earlier today.  Denies all other systemic symptoms.    ROS: Negative unless mentioned in HPI    Medical Hx: Allergies reviewed.  Immunizations are up-to-date.    Physical exam:    Constitutional: Patient is well-nourished and well-developed.  Sitting comfortably in the room and in no distress.  Oriented to person, place, time, and situation.    HEENT: Head is normocephalic, atraumatic. Patient's airway is patent.  Tympanic membranes are clear bilaterally.  Nasal mucosa clear.  Mouth with normal mucosa.  Throat is not erythematous and there are no oropharyngeal exudates, uvula is midline.  No obvious facial deformities.    Eyes: Clear bilaterally.  Pupils are equal round and reactive to light and accommodation.  Extraocular movements intact.      Cardiac: Regular  rate, regular rhythm.  Heart sounds S1, S2.  No murmurs, rubs, or gallops.  PMI nondisplaced.  No JVD.    Respiratory: Regular respiratory rate and effort.  Breath sounds are clear and equal bilaterally, no adventitious lung sounds.  Patient is speaking in full sentences and is in no apparent respiratory distress. No use of accessory muscles.      Gastrointestinal: Abdomen is soft, nondistended, and nontender.  There are no obvious deformities.  No rebound tenderness or guarding.  Bowel sounds are normal active.    Genitourinary: No CVA or flank tenderness.    Musculoskeletal: No reproducible tenderness.  No obvious skin or bony deformities.  Patient has equal range of motion in all extremities and no strength deficiencies.  No muscle or joint tenderness. No back or neck tenderness.  Capillary refill less than 3 seconds.  Strong peripheral pulses.  No sensory deficits.    Neurological: Patient is alert and oriented.  No focal deficits.  5/5 strength in all extremities.  Cranial nerves II through XII intact. GCS15.     Skin: Skin is normal color for race and is warm, dry, and intact.  No evidence of trauma.  No lesions, rashes, bruising, jaundice, or masses.    Psych: Appropriate mood and affect.  No apparent risk to self or others.    Heme/lymph: No adenopathy, lymphadenopathy, or splenomegaly    Physical exam is otherwise negative unless stated above or in history of present illness.              Patient History   No past medical history on file.  No past surgical history on file.  No family history on file.  Social History     Tobacco Use    Smoking status: Not on file    Smokeless tobacco: Not on file   Substance Use Topics    Alcohol use: Not on file    Drug use: Not on file       Physical Exam   ED Triage Vitals [01/17/25 1657]   Temperature Heart Rate Respirations BP   36 °C (96.8 °F) 87 18 115/58      Pulse Ox Temp src Heart Rate Source Patient Position   98 % -- -- --      BP Location FiO2 (%)     -- --        Physical Exam      ED Course & MDM   Diagnoses as of 01/17/25 1836   Viral bronchitis        Patient updated on plan for lab testing, IV insertion, radiology imaging, and medications to be administered while in the ER (if indicated). Patient updated on expected wait times for testing and results. Patient provided my name and told to ask any staff member for questions or concerns if they should arise. Electronic medical record reviewed.     MDM    Upon initial assessment, patient was healthy non-toxic appearing and in no apparent distress.     Patient presented to the emergency department with the chief complaint flulike symptoms.  Examination of the patient's heart and lungs is unremarkable.  On arrival to the emergency department, vital signs were within normal limits    Will obtain PCR swabs for COVID, influenza, strep, RSV and a chest x-ray.    Patient swabs are negative and chest x-ray reveals no sign of pneumonia.  Patient is likely experiencing a viral bronchitis.  I recommend that she continue taking the Mucinex and I will also send prescriptions for Afrin and Cepacol cough drops.  I discussed supportive treatment.  She will follow-up with her primary care provider.  All questions and concerns addressed.  Reasons to return to ER discussed.  Patient verbalized understanding and agreement with the treatment plan and they remained hemodynamically stable in the ER.    This note was dictated using a speech recognition program.  While an attempt was made at proof-reading to minimize errors, minor errors in transcription may be present         No data recorded     Barney Coma Scale Score: 15 (01/17/25 1700 : Karla Romo RN)                           Medical Decision Making      Procedure  Procedures     Hima Cannon PA-C  01/17/25 9626